# Patient Record
Sex: FEMALE | Race: WHITE | NOT HISPANIC OR LATINO
[De-identification: names, ages, dates, MRNs, and addresses within clinical notes are randomized per-mention and may not be internally consistent; named-entity substitution may affect disease eponyms.]

---

## 2017-12-01 PROBLEM — Z00.00 ENCOUNTER FOR PREVENTIVE HEALTH EXAMINATION: Status: ACTIVE | Noted: 2017-12-01

## 2017-12-27 ENCOUNTER — APPOINTMENT (OUTPATIENT)
Dept: NEUROLOGY | Facility: CLINIC | Age: 62
End: 2017-12-27
Payer: COMMERCIAL

## 2017-12-27 PROCEDURE — 99204 OFFICE O/P NEW MOD 45 MIN: CPT

## 2017-12-27 RX ORDER — NAPROXEN SODIUM 220 MG
TABLET ORAL
Refills: 0 | Status: ACTIVE | COMMUNITY

## 2018-03-28 ENCOUNTER — APPOINTMENT (OUTPATIENT)
Dept: NEUROLOGY | Facility: CLINIC | Age: 63
End: 2018-03-28
Payer: COMMERCIAL

## 2018-03-28 VITALS
DIASTOLIC BLOOD PRESSURE: 82 MMHG | BODY MASS INDEX: 25.16 KG/M2 | HEIGHT: 63 IN | WEIGHT: 142 LBS | SYSTOLIC BLOOD PRESSURE: 110 MMHG

## 2018-03-28 PROCEDURE — 99214 OFFICE O/P EST MOD 30 MIN: CPT

## 2018-03-28 RX ORDER — IBUPROFEN 200 MG/1
TABLET, COATED ORAL
Refills: 0 | Status: COMPLETED | COMMUNITY
End: 2018-03-28

## 2018-07-25 ENCOUNTER — APPOINTMENT (OUTPATIENT)
Dept: NEUROLOGY | Facility: CLINIC | Age: 63
End: 2018-07-25
Payer: COMMERCIAL

## 2018-07-25 VITALS
DIASTOLIC BLOOD PRESSURE: 74 MMHG | BODY MASS INDEX: 19.23 KG/M2 | HEIGHT: 72 IN | WEIGHT: 142 LBS | SYSTOLIC BLOOD PRESSURE: 110 MMHG

## 2018-07-25 PROCEDURE — 99213 OFFICE O/P EST LOW 20 MIN: CPT

## 2018-07-25 NOTE — PHYSICAL EXAM
[General Appearance - Alert] : alert [Oriented To Time, Place, And Person] : oriented to person, place, and time [Memory Recent] : recent memory was not impaired [Memory Remote] : remote memory was not impaired [Cranial Nerves Optic (II)] : visual acuity intact bilaterally,  visual fields full to confrontation, pupils equal round and reactive to light [Cranial Nerves Oculomotor (III)] : extraocular motion intact [Cranial Nerves Trigeminal (V)] : facial sensation intact symmetrically [Cranial Nerves Facial (VII)] : face symmetrical [Cranial Nerves Vestibulocochlear (VIII)] : hearing was intact bilaterally [Cranial Nerves Glossopharyngeal (IX)] : tongue and palate midline [Cranial Nerves Accessory (XI - Cranial And Spinal)] : head turning and shoulder shrug symmetric [Cranial Nerves Hypoglossal (XII)] : there was no tongue deviation with protrusion [Motor Strength] : muscle strength was normal in all four extremities [Sensation Tactile Decrease] : light touch was intact [Sensation Pain / Temperature Decrease] : pain and temperature was intact [Tremor] : a tremor present [1+] : Patella left 1+ [Aphasia] : no dysphasia/aphasia [Coordination - Dysmetria Impaired Finger-to-Nose Bilateral] : not present [Plantar Reflex Right Only] : normal on the right [Plantar Reflex Left Only] : normal on the left [FreeTextEntry6] : There is increased tone bilaterally with cogwheeling at the wrists and elbows. Strength is full to manual motor testing. The right hand is somewhat contracted. [FreeTextEntry8] : Gait is broad-based and shuffling. She requires a cane for support.

## 2018-07-25 NOTE — ASSESSMENT
[FreeTextEntry1] : This is a 63-year-old woman with Parkinson's disease.\par She is now on Sinemet.\par I will continue 25/100 3 times per day.\par \par The physical therapist Eric her exercises to do at home which she reports she is doing.\par \par I will see her back in 4 months.

## 2018-07-25 NOTE — CONSULT LETTER
[Dear  ___] : Dear  [unfilled], [Courtesy Letter:] : I had the pleasure of seeing your patient, [unfilled], in my office today. [Please see my note below.] : Please see my note below. [Consult Closing:] : Thank you very much for allowing me to participate in the care of this patient.  If you have any questions, please do not hesitate to contact me. [Sincerely,] : Sincerely, [FreeTextEntry3] : Javan Landaverde MD.

## 2018-07-25 NOTE — HISTORY OF PRESENT ILLNESS
[FreeTextEntry1] : I saw this patient in the office today.\par \par She had originally seen a neurologist back in 2001. She was ultimately diagnosed with Parkinson's disease. She declined treatment at that time.\par Her initial symptom was tremor involving predominantly the right hand. \par She believes this became worse in early 2011. It has been gradually progressing. Gradually it involved the left side to a lesser degree.\par She had seen Dr. Pritchett in this office back in 2014. She again declined treatment.\par She has now been noticing increasing stiffness over the past several years.\par Since mid 2017 she has noticed contraction in the right hand.\par She has associated balance difficulty. She has difficulty arising from a low chair.\par \par I had started her on Sinemet. I had also referred her physical therapy.\par She reports feeling significantly better with regard to her gait. She reports the tremors have decreased slightly as well.\par \par \par

## 2018-07-25 NOTE — DATA REVIEWED
[de-identified] : Brain MRI was performed on 4/8/11. 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 It was otherwise unremarkable. [de-identified] : Elda scan demonstrated bilateral reduced uptake in the basal ganglia.

## 2019-01-03 ENCOUNTER — APPOINTMENT (OUTPATIENT)
Dept: NEUROLOGY | Facility: CLINIC | Age: 64
End: 2019-01-03
Payer: COMMERCIAL

## 2019-01-03 VITALS
SYSTOLIC BLOOD PRESSURE: 120 MMHG | DIASTOLIC BLOOD PRESSURE: 72 MMHG | BODY MASS INDEX: 29.99 KG/M2 | WEIGHT: 180 LBS | HEIGHT: 65 IN

## 2019-01-03 PROCEDURE — 99213 OFFICE O/P EST LOW 20 MIN: CPT

## 2019-01-03 NOTE — DATA REVIEWED
[de-identified] : Brain MRI was performed on 4/8/11. 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 It was otherwise unremarkable. [de-identified] : Elda scan demonstrated bilateral reduced uptake in the basal ganglia.

## 2019-01-03 NOTE — PHYSICAL EXAM
[General Appearance - Alert] : alert [General Appearance - In No Acute Distress] : in no acute distress [Oriented To Time, Place, And Person] : oriented to person, place, and time [Memory Recent] : recent memory was not impaired [Memory Remote] : remote memory was not impaired [Cranial Nerves Optic (II)] : visual acuity intact bilaterally,  visual fields full to confrontation, pupils equal round and reactive to light [Cranial Nerves Oculomotor (III)] : extraocular motion intact [Cranial Nerves Trigeminal (V)] : facial sensation intact symmetrically [Cranial Nerves Facial (VII)] : face symmetrical [Cranial Nerves Vestibulocochlear (VIII)] : hearing was intact bilaterally [Cranial Nerves Glossopharyngeal (IX)] : tongue and palate midline [Cranial Nerves Accessory (XI - Cranial And Spinal)] : head turning and shoulder shrug symmetric [Cranial Nerves Hypoglossal (XII)] : there was no tongue deviation with protrusion [Motor Strength] : muscle strength was normal in all four extremities [Sensation Tactile Decrease] : light touch was intact [Sensation Pain / Temperature Decrease] : pain and temperature was intact [Sensation Vibration Decrease] : vibration was intact [Tremor] : a tremor present [1+] : Patella left 1+ [Optic Disc Abnormality] : the optic disc were normal in size and color [Edema] : there was no peripheral edema [Dysarthria] : no dysarthria [Aphasia] : no dysphasia/aphasia [Coordination - Dysmetria Impaired Finger-to-Nose Bilateral] : not present [Plantar Reflex Right Only] : normal on the right [Plantar Reflex Left Only] : normal on the left [FreeTextEntry6] : There is increased tone bilaterally with cogwheeling at the wrists and elbows. Strength is full to manual motor testing. The right hand is somewhat contracted. [FreeTextEntry8] : Gait is broad-based and shuffling. She requires a cane for support.

## 2019-01-03 NOTE — HISTORY OF PRESENT ILLNESS
[FreeTextEntry1] : I saw this patient in the office today.\par \par As you recall, she had originally seen a neurologist back in 2001. She was ultimately diagnosed with Parkinson's disease. She declined treatment at that time.\par Her initial symptom was tremor involving predominantly the right hand. \par She believes this became worse in early 2011. It has been gradually progressing. Gradually it involved the left side to a lesser degree.\par She had seen Dr. Pritchett in this office back in 2014. She again declined treatment.\par She has now been noticing increasing stiffness over the past several years.\par Since mid 2017 she has noticed contraction in the right hand.\par She has associated balance difficulty. She has difficulty arising from a low chair.\par \par I had ultimately started her on Sinemet. I had also referred her physical therapy.\par She reports feeling significantly better with regard to her gait. She reports the tremors have decreased slightly as well.\par \par \par

## 2019-01-03 NOTE — ASSESSMENT
[FreeTextEntry1] : This is a 63 year-old woman with Parkinson's disease.\par She is now on Sinemet.\par I will continue 25/100 3 times per day.\par \par The physical therapist gave her exercises to do at home which she reports she is doing.\par \par I will see her back in 6 months.

## 2019-04-10 ENCOUNTER — TRANSCRIPTION ENCOUNTER (OUTPATIENT)
Age: 64
End: 2019-04-10

## 2019-07-12 ENCOUNTER — MEDICATION RENEWAL (OUTPATIENT)
Age: 64
End: 2019-07-12

## 2019-08-08 ENCOUNTER — APPOINTMENT (OUTPATIENT)
Dept: NEUROLOGY | Facility: CLINIC | Age: 64
End: 2019-08-08
Payer: COMMERCIAL

## 2019-08-08 VITALS
BODY MASS INDEX: 29.99 KG/M2 | WEIGHT: 180 LBS | DIASTOLIC BLOOD PRESSURE: 80 MMHG | SYSTOLIC BLOOD PRESSURE: 110 MMHG | HEIGHT: 65 IN

## 2019-08-08 PROCEDURE — 99213 OFFICE O/P EST LOW 20 MIN: CPT

## 2019-08-08 NOTE — PHYSICAL EXAM
[General Appearance - Alert] : alert [Oriented To Time, Place, And Person] : oriented to person, place, and time [General Appearance - In No Acute Distress] : in no acute distress [Memory Recent] : recent memory was not impaired [Memory Remote] : remote memory was not impaired [Cranial Nerves Optic (II)] : visual acuity intact bilaterally,  visual fields full to confrontation, pupils equal round and reactive to light [Cranial Nerves Oculomotor (III)] : extraocular motion intact [Cranial Nerves Trigeminal (V)] : facial sensation intact symmetrically [Cranial Nerves Vestibulocochlear (VIII)] : hearing was intact bilaterally [Cranial Nerves Facial (VII)] : face symmetrical [Cranial Nerves Glossopharyngeal (IX)] : tongue and palate midline [Cranial Nerves Accessory (XI - Cranial And Spinal)] : head turning and shoulder shrug symmetric [Cranial Nerves Hypoglossal (XII)] : there was no tongue deviation with protrusion [Motor Strength] : muscle strength was normal in all four extremities [Sensation Tactile Decrease] : light touch was intact [Sensation Vibration Decrease] : vibration was intact [Sensation Pain / Temperature Decrease] : pain and temperature was intact [Tremor] : a tremor present [1+] : Patella left 1+ [Optic Disc Abnormality] : the optic disc were normal in size and color [Edema] : there was no peripheral edema [Dysarthria] : no dysarthria [Aphasia] : no dysphasia/aphasia [Coordination - Dysmetria Impaired Finger-to-Nose Bilateral] : not present [Plantar Reflex Right Only] : normal on the right [Plantar Reflex Left Only] : normal on the left [FreeTextEntry6] : There is increased tone bilaterally with cogwheeling at the wrists and elbows. Strength is full to manual motor testing. The right hand is somewhat contracted. [FreeTextEntry8] : Gait is broad-based and shuffling. She requires a cane for support.

## 2019-08-08 NOTE — ASSESSMENT
[FreeTextEntry1] : This is a 64 year-old woman with Parkinson's disease.\par She is now on Sinemet.\par I will continue 25/100 3 times per day.\par I will add Comtan 200 mg 3 times per day.\par \par Her physical therapist gave her exercises to do at home which she reports she is doing.\par \par I will see her back in 6 months.

## 2019-08-08 NOTE — HISTORY OF PRESENT ILLNESS
[FreeTextEntry1] : I saw this patient in the office today.\par \par As you recall, she had originally seen a neurologist back in 2001. She was ultimately diagnosed with Parkinson's disease. She declined treatment at that time.\par Her initial symptom was tremor involving predominantly the right hand. \par She believes this became worse in early 2011. It has been gradually progressing. Gradually it involved the left side to a lesser degree.\par She had seen Dr. Pritchett in this office back in 2014. She again declined treatment.\par She has now been noticing increasing stiffness over the past several years.\par Since mid 2017 she has noticed contraction in the right hand.\par She has associated balance difficulty. She has difficulty arising from a low chair.\par \par I had ultimately started her on Sinemet. I had also referred her physical therapy.\par She initially had reported feeling significantly better.\par \par She now reports slight progression since her last visit January\par \par \par

## 2019-08-08 NOTE — CONSULT LETTER
[Dear  ___] : Dear  [unfilled], [Please see my note below.] : Please see my note below. [Courtesy Letter:] : I had the pleasure of seeing your patient, [unfilled], in my office today. [Consult Closing:] : Thank you very much for allowing me to participate in the care of this patient.  If you have any questions, please do not hesitate to contact me. [Sincerely,] : Sincerely, [FreeTextEntry3] : Javan Landaverde MD.

## 2019-08-08 NOTE — DATA REVIEWED
[de-identified] : Elda scan demonstrated bilateral reduced uptake in the basal ganglia. [de-identified] : Brain MRI was performed on 4/8/11. 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 It was otherwise unremarkable.

## 2020-02-19 ENCOUNTER — APPOINTMENT (OUTPATIENT)
Dept: NEUROLOGY | Facility: CLINIC | Age: 65
End: 2020-02-19
Payer: COMMERCIAL

## 2020-02-19 VITALS
HEIGHT: 66 IN | SYSTOLIC BLOOD PRESSURE: 125 MMHG | BODY MASS INDEX: 25.71 KG/M2 | DIASTOLIC BLOOD PRESSURE: 60 MMHG | WEIGHT: 160 LBS

## 2020-02-19 PROCEDURE — 99213 OFFICE O/P EST LOW 20 MIN: CPT

## 2020-02-19 NOTE — ASSESSMENT
[FreeTextEntry1] : This is a 64 year-old woman with Parkinson's disease.\par She is now on Sinemet.\par I will continue 25/100 3 times per day.\par I have explained that should she notice progression she can begin the Comtan as previously planned.\par \par Her physical therapist had given her exercises to do at home which she reports she is doing.\par \par I will see her back in 6 months.

## 2020-02-19 NOTE — HISTORY OF PRESENT ILLNESS
[FreeTextEntry1] : I saw this patient in the office today.\par \par As you recall, she had originally seen a neurologist back in 2001. She was ultimately diagnosed with Parkinson's disease. She declined treatment at that time.\par Her initial symptom was tremor involving predominantly the right hand. \par She believes this became worse in early 2011. It has been gradually progressing. Gradually it involved the left side to a lesser degree.\par She had seen Dr. Pritchett in this office back in 2014. She again declined treatment.\par She has now been noticing increasing stiffness over the past several years.\par Since mid 2017 she has noticed contraction in the right hand.\par She has associated balance difficulty. She has difficulty arising from a low chair.\par \par I had ultimately started her on Sinemet. I had also referred her physical therapy.\par She initially had reported feeling significantly better.\par She then had some progression and I had added Comtan.\par She now reports that she never started it, and she has noticed no further progression.\par In fact she has been ablated without an assistive device.\par \par \par \par \par \par \par

## 2020-02-19 NOTE — PHYSICAL EXAM
[General Appearance - Alert] : alert [General Appearance - In No Acute Distress] : in no acute distress [Oriented To Time, Place, And Person] : oriented to person, place, and time [Affect] : the affect was normal [Memory Recent] : recent memory was not impaired [Memory Remote] : remote memory was not impaired [Cranial Nerves Optic (II)] : visual acuity intact bilaterally,  visual fields full to confrontation, pupils equal round and reactive to light [Cranial Nerves Oculomotor (III)] : extraocular motion intact [Cranial Nerves Vestibulocochlear (VIII)] : hearing was intact bilaterally [Cranial Nerves Facial (VII)] : face symmetrical [Cranial Nerves Trigeminal (V)] : facial sensation intact symmetrically [Cranial Nerves Glossopharyngeal (IX)] : tongue and palate midline [Cranial Nerves Hypoglossal (XII)] : there was no tongue deviation with protrusion [Cranial Nerves Accessory (XI - Cranial And Spinal)] : head turning and shoulder shrug symmetric [Sensation Tactile Decrease] : light touch was intact [Sensation Pain / Temperature Decrease] : pain and temperature was intact [Motor Strength] : muscle strength was normal in all four extremities [Sensation Vibration Decrease] : vibration was intact [Tremor] : a tremor present [1+] : Patella left 1+ [Optic Disc Abnormality] : the optic disc were normal in size and color [Edema] : there was no peripheral edema [Dysarthria] : no dysarthria [Plantar Reflex Right Only] : normal on the right [Aphasia] : no dysphasia/aphasia [Coordination - Dysmetria Impaired Finger-to-Nose Bilateral] : not present [Plantar Reflex Left Only] : normal on the left [FreeTextEntry6] : There is increased tone bilaterally with cogwheeling at the wrists and elbows. Strength is full to manual motor testing. The right hand is somewhat contracted. [FreeTextEntry8] : Gait is broad-based and shuffling. She is ambulating without an assistive device today.

## 2020-02-19 NOTE — DATA REVIEWED
[de-identified] : Brain MRI was performed on 4/8/11. 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 It was otherwise unremarkable. [de-identified] : Elda scan demonstrated bilateral reduced uptake in the basal ganglia.

## 2020-08-19 ENCOUNTER — APPOINTMENT (OUTPATIENT)
Dept: NEUROLOGY | Facility: CLINIC | Age: 65
End: 2020-08-19
Payer: COMMERCIAL

## 2020-08-19 PROCEDURE — 99213 OFFICE O/P EST LOW 20 MIN: CPT | Mod: 95

## 2020-08-19 NOTE — CONSULT LETTER
[Please see my note below.] : Please see my note below. [Courtesy Letter:] : I had the pleasure of seeing your patient, [unfilled], in my office today. [Dear  ___] : Dear  [unfilled], [Consult Closing:] : Thank you very much for allowing me to participate in the care of this patient.  If you have any questions, please do not hesitate to contact me. [Sincerely,] : Sincerely, [FreeTextEntry3] : Javan Landaverde MD.

## 2020-08-19 NOTE — DATA REVIEWED
[de-identified] : Brain MRI was performed on 4/8/11. 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 It was otherwise unremarkable. [de-identified] : Elda scan demonstrated bilateral reduced uptake in the basal ganglia.

## 2020-08-19 NOTE — HISTORY OF PRESENT ILLNESS
[Home] : at home, [unfilled] , at the time of the visit. [Medical Office: ([facility])___] : at the medical office located in  [FreeTextEntry1] : This patient had a tele health visit today. \par \par As you recall, she had originally seen a neurologist back in 2001. She was ultimately diagnosed with Parkinson's disease. She declined treatment at that time.\par Her initial symptom was tremor involving predominantly the right hand. \par She believes this became worse in early 2011. It has been gradually progressing. Gradually it involved the left side to a lesser degree.\par She had seen Dr. Pritchett in this office back in 2014. She again declined treatment.\par She has now been noticing increasing stiffness over the past several years.\par Since mid 2017 she has noticed contraction in the right hand.\par She has associated balance difficulty. She has difficulty arising from a low chair.\par \par I had ultimately started her on Sinemet. I had also referred her physical therapy.\par She initially had reported feeling significantly better.\par She then had some progression and I had added Comtan.\par She is now taking them three times per day. \par \par She has noticed that the doses did not last as long as they had previously.\par She also reports some difficulty moving around in bed.\par \par \par \par \par \par \par

## 2020-08-19 NOTE — PHYSICAL EXAM
[FreeTextEntry1] : Examination:\par Examination is limited due to the nature of tele health visit. \par \par The patient is well appearing and in no acute distress. \par \par Neurological Examination: \par Mental status: The patient is awake, alert, and oriented. Attention span seems normal. Recent and remote memory seem intact. \par Cranial nerves: Extraocular motion is full. Face appears symmetric, Shoulder shrug is symmetric, tongue is midline. \par Motor: There is no pronator drift. Fine finger movement is intact. There is mild tremor of the upper extremities at rest. \par Sensory: Unable to test.\par Reflexes: Unable to test. \par Cerebellar: No finger nose dysmetria.\par \par Gait is broad based. \par \par There is no edema seen. \par

## 2020-08-19 NOTE — ASSESSMENT
[FreeTextEntry1] : This is a 65 year-old woman with Parkinson's disease.\par She remains on Sinemet and Comtan three times per day.\par I will add a daytime dose of Sinemet.\par \par Her physical therapist had given her exercises to do at home which she reports she is doing.\par \par I will see her back in 6 months.

## 2021-02-17 ENCOUNTER — NON-APPOINTMENT (OUTPATIENT)
Age: 66
End: 2021-02-17

## 2021-04-01 ENCOUNTER — APPOINTMENT (OUTPATIENT)
Dept: NEUROLOGY | Facility: CLINIC | Age: 66
End: 2021-04-01
Payer: COMMERCIAL

## 2021-04-01 VITALS
HEIGHT: 66 IN | DIASTOLIC BLOOD PRESSURE: 62 MMHG | WEIGHT: 160 LBS | BODY MASS INDEX: 25.71 KG/M2 | SYSTOLIC BLOOD PRESSURE: 88 MMHG

## 2021-04-01 PROCEDURE — 99072 ADDL SUPL MATRL&STAF TM PHE: CPT

## 2021-04-01 PROCEDURE — 99213 OFFICE O/P EST LOW 20 MIN: CPT

## 2021-04-01 NOTE — ASSESSMENT
[FreeTextEntry1] : This is a 65 year-old woman with Parkinson's disease.\par She remains on Sinemet and Comtan three times per day.\par I had added a bedtime dose of Sinemet ER which she has note yet started. \par \par Her physical therapist had given her exercises to do at home which she reports she is doing.\par \par She would be a candidate for Kynmobi (sublingual strips) for the off episodes. \par At present she is hesitant as it requires a nurse to come in to her home to monitor the first dose. \par She will defer this for now (due to the pandemic) and we can discuss it further down the road. \par \par I will see her back in 4 months.

## 2021-04-01 NOTE — DATA REVIEWED
[de-identified] : Brain MRI was performed on 4/8/11. 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 It was otherwise unremarkable. [de-identified] : Elda scan demonstrated bilateral reduced uptake in the basal ganglia.

## 2021-04-01 NOTE — PHYSICAL EXAM
[General Appearance - Alert] : alert [General Appearance - In No Acute Distress] : in no acute distress [Oriented To Time, Place, And Person] : oriented to person, place, and time [Affect] : the affect was normal [Memory Recent] : recent memory was not impaired [Memory Remote] : remote memory was not impaired [Cranial Nerves Optic (II)] : visual acuity intact bilaterally,  visual fields full to confrontation, pupils equal round and reactive to light [Cranial Nerves Trigeminal (V)] : facial sensation intact symmetrically [Cranial Nerves Oculomotor (III)] : extraocular motion intact [Cranial Nerves Facial (VII)] : face symmetrical [Cranial Nerves Vestibulocochlear (VIII)] : hearing was intact bilaterally [Cranial Nerves Glossopharyngeal (IX)] : tongue and palate midline [Cranial Nerves Accessory (XI - Cranial And Spinal)] : head turning and shoulder shrug symmetric [Cranial Nerves Hypoglossal (XII)] : there was no tongue deviation with protrusion [Motor Strength] : muscle strength was normal in all four extremities [Sensation Tactile Decrease] : light touch was intact [Sensation Pain / Temperature Decrease] : pain and temperature was intact [Sensation Vibration Decrease] : vibration was intact [1+] : Patella left 1+ [Optic Disc Abnormality] : the optic disc were normal in size and color [Edema] : there was no peripheral edema [Tremor] : a tremor present [Dysarthria] : no dysarthria [Aphasia] : no dysphasia/aphasia [Coordination - Dysmetria Impaired Finger-to-Nose Bilateral] : not present [Plantar Reflex Right Only] : normal on the right [Plantar Reflex Left Only] : normal on the left [FreeTextEntry6] : There is increased tone bilaterally with cogwheeling at the wrists and elbows. Strength is full to manual motor testing. The right hand is somewhat contracted. [FreeTextEntry8] : Gait is broad-based and shuffling. She is ambulating without an assistive device today.

## 2021-04-01 NOTE — HISTORY OF PRESENT ILLNESS
[FreeTextEntry1] : I saw this patient in the office today. \par \par As you recall, she had originally seen a neurologist back in 2001. She was ultimately diagnosed with Parkinson's disease. She declined treatment at that time.\par Her initial symptom was tremor involving predominantly the right hand. \par She believes this became worse in early 2011. It has been gradually progressing. Gradually it involved the left side to a lesser degree.\par She had seen Dr. Pritchett in this office back in 2014. She again declined treatment.\par She has now been noticing increasing stiffness over the past several years.\par Since mid 2017 she has noticed contraction in the right hand.\par She has associated balance difficulty. She has difficulty arising from a low chair.\par \par I had ultimately started her on Sinemet. I had also referred her physical therapy.\par She initially had reported feeling significantly better.\par She then had some progression and I had added Comtan.\par She is now taking them three times per day. \par \par She has noticed that the doses did not last as long as they had previously.\par She also reports some difficulty moving around in bed.\par \par She reports some episodes in the afternoon when she feels as if she is "turned off" and  cannot move at all.\par \par \par \par \par

## 2021-05-01 ENCOUNTER — RX RENEWAL (OUTPATIENT)
Age: 66
End: 2021-05-01

## 2021-08-13 ENCOUNTER — NON-APPOINTMENT (OUTPATIENT)
Age: 66
End: 2021-08-13

## 2021-08-16 ENCOUNTER — APPOINTMENT (OUTPATIENT)
Dept: NEUROLOGY | Facility: CLINIC | Age: 66
End: 2021-08-16
Payer: COMMERCIAL

## 2021-08-16 PROCEDURE — 99213 OFFICE O/P EST LOW 20 MIN: CPT

## 2021-08-16 NOTE — PHYSICAL EXAM
[General Appearance - Alert] : alert [General Appearance - In No Acute Distress] : in no acute distress [Oriented To Time, Place, And Person] : oriented to person, place, and time [Affect] : the affect was normal [Memory Recent] : recent memory was not impaired [Memory Remote] : remote memory was not impaired [Cranial Nerves Optic (II)] : visual acuity intact bilaterally,  visual fields full to confrontation, pupils equal round and reactive to light [Cranial Nerves Oculomotor (III)] : extraocular motion intact [Cranial Nerves Trigeminal (V)] : facial sensation intact symmetrically [Cranial Nerves Facial (VII)] : face symmetrical [Cranial Nerves Vestibulocochlear (VIII)] : hearing was intact bilaterally [Cranial Nerves Glossopharyngeal (IX)] : tongue and palate midline [Cranial Nerves Accessory (XI - Cranial And Spinal)] : head turning and shoulder shrug symmetric [Cranial Nerves Hypoglossal (XII)] : there was no tongue deviation with protrusion [Motor Strength] : muscle strength was normal in all four extremities [Sensation Tactile Decrease] : light touch was intact [Sensation Pain / Temperature Decrease] : pain and temperature was intact [Sensation Vibration Decrease] : vibration was intact [Tremor] : a tremor present [1+] : Patella left 1+ [Optic Disc Abnormality] : the optic disc were normal in size and color [Edema] : there was no peripheral edema [Dysarthria] : no dysarthria [Aphasia] : no dysphasia/aphasia [Coordination - Dysmetria Impaired Finger-to-Nose Bilateral] : not present [Plantar Reflex Right Only] : normal on the right [Plantar Reflex Left Only] : normal on the left [FreeTextEntry6] : There is increased tone bilaterally with cogwheeling at the wrists and elbows. Strength is full to manual motor testing. The right hand is somewhat contracted. [FreeTextEntry8] : Gait is broad-based and shuffling. She is ambulating without an assistive device today.

## 2021-08-16 NOTE — ASSESSMENT
[FreeTextEntry1] : This is a 65 year-old woman with Parkinson's disease.\par She remains on Sinemet and Comtan three times per day.\par I had added a bedtime dose of Sinemet ER which she has note yet started. \par \par Her physical therapist had given her exercises to do at home which she reports she is doing.\par \par She would be a candidate for Kynmobi (sublingual strips) for the off episodes. \par I discussed this with her again.\par At present she is hesitant as it requires a nurse to come in to her home to monitor the first dose. \par She will defer this for now (due to the pandemic) and we can discuss it further down the road. \par \par I will see her back in 4 months.

## 2021-08-16 NOTE — HISTORY OF PRESENT ILLNESS
[FreeTextEntry1] : I saw this patient in the office today. \par \par As you recall, she had originally seen a neurologist back in 2001. She was ultimately diagnosed with Parkinson's disease. She declined treatment at that time.\par Her initial symptom was tremor involving predominantly the right hand. \par She believes this became worse in early 2011. It has been gradually progressing. Gradually it involved the left side to a lesser degree.\par She had seen Dr. Pritchett in this office back in 2014. She again declined treatment.\par She has now been noticing increasing stiffness over the past several years.\par Since mid 2017 she has noticed contraction in the right hand.\par She has associated balance difficulty. She has difficulty arising from a low chair.\par \par I had ultimately started her on Sinemet. I had also referred her physical therapy.\par She initially had reported feeling significantly better.\par She then had some progression and I had added Comtan.\par She is now taking them three times per day. \par I had added a bedtime dose of Sinemet ER.\par \par She has noticed that the doses did not last as long as they had previously.\par She also reports some difficulty moving around in bed.\par \par She reports some episodes in the afternoon when she feels as if she is "turned off" and  cannot move at all.\par \par \par \par \par

## 2021-08-16 NOTE — DATA REVIEWED
[de-identified] : Brain MRI was performed on 4/8/11. 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 It was otherwise unremarkable. [de-identified] : Elda scan demonstrated bilateral reduced uptake in the basal ganglia.

## 2022-01-10 ENCOUNTER — APPOINTMENT (OUTPATIENT)
Dept: NEUROLOGY | Facility: CLINIC | Age: 67
End: 2022-01-10
Payer: COMMERCIAL

## 2022-01-10 PROCEDURE — 99213 OFFICE O/P EST LOW 20 MIN: CPT | Mod: 95

## 2022-01-10 NOTE — DATA REVIEWED
[de-identified] : Brain MRI was performed on 4/8/11. 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 It was otherwise unremarkable. [de-identified] : Elda scan demonstrated bilateral reduced uptake in the basal ganglia.

## 2022-01-10 NOTE — HISTORY OF PRESENT ILLNESS
[Home] : at home, [unfilled] , at the time of the visit. [Medical Office: ([facility])___] : at the medical office located in  [FreeTextEntry1] : This patient had a tele health visit today. \par \par As you recall, she had originally seen a neurologist back in 2001. She was ultimately diagnosed with Parkinson's disease. She declined treatment at that time.\par Her initial symptom was tremor involving predominantly the right hand. \par She believes this became worse in early 2011. It has been gradually progressing. Gradually it involved the left side to a lesser degree.\par She had seen Dr. Pritchett in this office back in 2014. She again declined treatment.\par She has now been noticing increasing stiffness over the past several years.\par Since mid 2017 she has noticed contraction in the right hand.\par She has associated balance difficulty. She has difficulty arising from a low chair.\par \par I had ultimately started her on Sinemet. I had also referred her physical therapy.\par She initially had reported feeling significantly better.\par She then had some progression and I had added Comtan.\par She is now taking them three times per day. \par I had added a bedtime dose of Sinemet ER.\par \par She has noticed that the doses did not last as long as they had previously.\par She also reports some difficulty moving around in bed.\par \par She reported some episodes in the afternoon when she feels as if she is "turned off" and  cannot move at all.\par This has not been bothering her as much lately.\par \par \par \par

## 2022-01-10 NOTE — ASSESSMENT
[FreeTextEntry1] : This is a 66 year-old woman with Parkinson's disease.\par She remains on Sinemet and Comtan three times per day.\par I had added a bedtime dose of Sinemet ER.\par \par Her physical therapist had given her exercises to do at home which she reports she is doing.\par \par At present she does not appear to require "rescue" medication. \par (I would consider Inbrija (inhaled Levodopa) if this changes). \par \par I will see her back in 4 months.

## 2022-03-30 ENCOUNTER — NON-APPOINTMENT (OUTPATIENT)
Age: 67
End: 2022-03-30

## 2022-03-31 ENCOUNTER — APPOINTMENT (OUTPATIENT)
Dept: NEUROLOGY | Facility: CLINIC | Age: 67
End: 2022-03-31
Payer: COMMERCIAL

## 2022-03-31 VITALS
SYSTOLIC BLOOD PRESSURE: 80 MMHG | WEIGHT: 156 LBS | DIASTOLIC BLOOD PRESSURE: 60 MMHG | BODY MASS INDEX: 25.68 KG/M2 | HEIGHT: 65.5 IN

## 2022-03-31 PROCEDURE — 99213 OFFICE O/P EST LOW 20 MIN: CPT

## 2022-03-31 NOTE — DATA REVIEWED
[de-identified] : Brain MRI was performed on 4/8/11. 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 It was otherwise unremarkable. [de-identified] : Elda scan demonstrated bilateral reduced uptake in the basal ganglia.

## 2022-03-31 NOTE — HISTORY OF PRESENT ILLNESS
[FreeTextEntry1] : I saw this patient in the office today. \par \par As you recall, she had originally seen a neurologist back in 2001. She was ultimately diagnosed with Parkinson's disease. She declined treatment at that time.\par Her initial symptom was tremor involving predominantly the right hand. \par She believes this became worse in early 2011. It has been gradually progressing. Gradually it involved the left side to a lesser degree.\par She had seen Dr. Pritchett in this office back in 2014. She again declined treatment.\par She has now been noticing increasing stiffness over the past several years.\par Since mid 2017 she has noticed contraction in the right hand.\par She has associated balance difficulty. She has difficulty arising from a low chair.\par \par I had ultimately started her on Sinemet. I had also referred her physical therapy.\par She initially had reported feeling significantly better.\par She then had some progression and I had added Comtan.\par She is now taking them three times per day. \par I had added a bedtime dose of Sinemet ER.\par \par She has noticed that the doses did not last as long as they had previously.\par She also reports some difficulty moving around in bed.\par \par She reported some episodes in the afternoon when she feels as if she is "turned off" and  cannot move at all.\par This has been getting worse.\par \par \par \par

## 2022-04-07 ENCOUNTER — NON-APPOINTMENT (OUTPATIENT)
Age: 67
End: 2022-04-07

## 2022-05-11 ENCOUNTER — APPOINTMENT (OUTPATIENT)
Dept: NEUROLOGY | Facility: CLINIC | Age: 67
End: 2022-05-11
Payer: COMMERCIAL

## 2022-05-11 VITALS
HEIGHT: 65 IN | SYSTOLIC BLOOD PRESSURE: 112 MMHG | DIASTOLIC BLOOD PRESSURE: 80 MMHG | WEIGHT: 155 LBS | BODY MASS INDEX: 25.83 KG/M2

## 2022-05-11 PROCEDURE — 99213 OFFICE O/P EST LOW 20 MIN: CPT

## 2022-05-11 NOTE — DATA REVIEWED
[de-identified] : Brain MRI was performed on 4/8/11. 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 It was otherwise unremarkable. [de-identified] : Elda scan demonstrated bilateral reduced uptake in the basal ganglia.

## 2022-06-13 ENCOUNTER — RX RENEWAL (OUTPATIENT)
Age: 67
End: 2022-06-13

## 2022-07-11 ENCOUNTER — RX RENEWAL (OUTPATIENT)
Age: 67
End: 2022-07-11

## 2022-08-26 ENCOUNTER — RX RENEWAL (OUTPATIENT)
Age: 67
End: 2022-08-26

## 2022-09-14 ENCOUNTER — RX RENEWAL (OUTPATIENT)
Age: 67
End: 2022-09-14

## 2022-09-19 ENCOUNTER — APPOINTMENT (OUTPATIENT)
Dept: NEUROLOGY | Facility: CLINIC | Age: 67
End: 2022-09-19

## 2022-09-19 VITALS
BODY MASS INDEX: 25.83 KG/M2 | DIASTOLIC BLOOD PRESSURE: 70 MMHG | SYSTOLIC BLOOD PRESSURE: 120 MMHG | HEIGHT: 65 IN | WEIGHT: 155 LBS

## 2022-09-19 PROCEDURE — 99213 OFFICE O/P EST LOW 20 MIN: CPT

## 2022-09-19 NOTE — DATA REVIEWED
[de-identified] : Brain MRI was performed on 4/8/11. 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 It was otherwise unremarkable. [de-identified] : Elda scan demonstrated bilateral reduced uptake in the basal ganglia.

## 2022-09-19 NOTE — ASSESSMENT
[FreeTextEntry1] : This is a 67 year-old woman with Parkinson's disease.\par She remains on Sinemet and Comtan three times per day.\par I had added a bedtime dose of Sinemet ER.\par \par Her physical therapist had given her exercises to do at home which she reports she is doing.\par \par She had reported off episodes in the afternoon.\par I had suggested a trial of Inbrija to be used as needed.  (This is an inhaled rescue dose of levodopa).\par \par I will see her back in a few months.

## 2022-09-30 ENCOUNTER — TRANSCRIPTION ENCOUNTER (OUTPATIENT)
Age: 67
End: 2022-09-30

## 2022-12-22 ENCOUNTER — RX RENEWAL (OUTPATIENT)
Age: 67
End: 2022-12-22

## 2022-12-26 ENCOUNTER — RX RENEWAL (OUTPATIENT)
Age: 67
End: 2022-12-26

## 2023-01-02 ENCOUNTER — NON-APPOINTMENT (OUTPATIENT)
Age: 68
End: 2023-01-02

## 2023-01-03 ENCOUNTER — APPOINTMENT (OUTPATIENT)
Dept: NEUROLOGY | Facility: CLINIC | Age: 68
End: 2023-01-03
Payer: MEDICARE

## 2023-01-03 VITALS
HEIGHT: 65 IN | BODY MASS INDEX: 25.83 KG/M2 | DIASTOLIC BLOOD PRESSURE: 60 MMHG | WEIGHT: 155 LBS | SYSTOLIC BLOOD PRESSURE: 110 MMHG

## 2023-01-03 PROCEDURE — 99213 OFFICE O/P EST LOW 20 MIN: CPT

## 2023-01-03 NOTE — HISTORY OF PRESENT ILLNESS
[FreeTextEntry1] : I saw this patient in the office today. \par \par As you recall, she had originally seen a neurologist back in 2001. She was ultimately diagnosed with Parkinson's disease. She declined treatment at that time.\par Her initial symptom was tremor involving predominantly the right hand. \par She believes this became worse in early 2011. It has been gradually progressing. Gradually it involved the left side to a lesser degree.\par She had seen Dr. Pritchett in this office back in 2014. She again declined treatment.\par She has now been noticing increasing stiffness over the past several years.\par Since mid 2017 she has noticed contraction in the right hand.\par She has associated balance difficulty. She has difficulty arising from a low chair.\par \par I had ultimately started her on Sinemet. I had also referred her physical therapy.\par She initially had reported feeling significantly better.\par She then had some progression and I had added Comtan.\par She is now taking them three times per day. \par I had added a bedtime dose of Sinemet ER.\par \par She reported some episodes in the afternoon when she feels as if she is "turned off" and  cannot move at all.\par This has been getting worse.\par I had started her on Inbrija as needed.\par \par 1/3/2023 visit:\par She reports feeling relatively stable at this point.\par

## 2023-01-03 NOTE — DATA REVIEWED
[de-identified] : Brain MRI was performed on 4/8/11. 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 It was otherwise unremarkable. [de-identified] : Elda scan demonstrated bilateral reduced uptake in the basal ganglia.

## 2023-01-03 NOTE — ASSESSMENT
[FreeTextEntry1] : This is a 67 year-old woman with Parkinson's disease.\par She remains on Sinemet and Comtan three times per day.\par I had added a bedtime dose of Sinemet ER.\par \par Her physical therapist had given her exercises to do at home which she reports she is doing.\par \par She had reported off episodes in the afternoon.\par I had suggested a trial of Inbrija to be used as needed.  (This is an inhaled rescue dose of levodopa).\par \par I will see her back in 4 months.

## 2023-02-24 ENCOUNTER — RX RENEWAL (OUTPATIENT)
Age: 68
End: 2023-02-24

## 2023-03-27 ENCOUNTER — RX RENEWAL (OUTPATIENT)
Age: 68
End: 2023-03-27

## 2023-04-14 ENCOUNTER — APPOINTMENT (OUTPATIENT)
Dept: NEUROLOGY | Facility: CLINIC | Age: 68
End: 2023-04-14
Payer: MEDICARE

## 2023-04-14 VITALS
SYSTOLIC BLOOD PRESSURE: 110 MMHG | WEIGHT: 155 LBS | HEIGHT: 65 IN | DIASTOLIC BLOOD PRESSURE: 70 MMHG | BODY MASS INDEX: 25.83 KG/M2

## 2023-04-14 PROCEDURE — 99213 OFFICE O/P EST LOW 20 MIN: CPT

## 2023-04-14 NOTE — ASSESSMENT
[FreeTextEntry1] : This is a 67 year-old woman with Parkinson's disease.\par She remains on Sinemet and Comtan three times per day.\par I had added a bedtime dose of Sinemet ER.\par \par Her physical therapist had given her exercises to do at home which she reports she is doing.\par \par She had reported off episodes in the afternoon.\par I had suggested a trial of Inbrija to be used as needed.  (This is an inhaled rescue dose of levodopa).\par \par I will see her back in 4-5 months.

## 2023-04-14 NOTE — DATA REVIEWED
[de-identified] : Brain MRI was performed on 4/8/11. 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 It was otherwise unremarkable. [de-identified] : Elda scan demonstrated bilateral reduced uptake in the basal ganglia.

## 2023-05-02 ENCOUNTER — RX RENEWAL (OUTPATIENT)
Age: 68
End: 2023-05-02

## 2023-05-22 ENCOUNTER — NON-APPOINTMENT (OUTPATIENT)
Age: 68
End: 2023-05-22

## 2023-05-23 ENCOUNTER — OFFICE (OUTPATIENT)
Dept: URBAN - METROPOLITAN AREA CLINIC 104 | Facility: CLINIC | Age: 68
Setting detail: OPHTHALMOLOGY
End: 2023-05-23
Payer: MEDICARE

## 2023-05-23 DIAGNOSIS — H43.393: ICD-10-CM

## 2023-05-23 DIAGNOSIS — H25.13: ICD-10-CM

## 2023-05-23 DIAGNOSIS — H25.11: ICD-10-CM

## 2023-05-23 DIAGNOSIS — H43.813: ICD-10-CM

## 2023-05-23 PROBLEM — H25.12 CATARACT SENILE NUCLEAR SCLEROSIS; RIGHT EYE, LEFT EYE, BOTH EYES ;
W/CORTICAL: Status: ACTIVE | Noted: 2023-05-23

## 2023-05-23 PROCEDURE — 99204 OFFICE O/P NEW MOD 45 MIN: CPT | Performed by: SPECIALIST

## 2023-05-23 PROCEDURE — 92136 OPHTHALMIC BIOMETRY: CPT | Performed by: SPECIALIST

## 2023-05-23 ASSESSMENT — REFRACTION_AUTOREFRACTION
OD_CYLINDER: -1.00
OS_SPHERE: PLANO
OD_AXIS: 108
OD_SPHERE: -0.50
OS_AXIS: 122
OS_CYLINDER: -1.00

## 2023-05-23 ASSESSMENT — KERATOMETRY
OD_K1POWER_DIOPTERS: 42.45
OS_K1K2_AVERAGE: 42.695
OD_K2POWER_DIOPTERS: 43.21
OS_K1POWER_DIOPTERS: 42.56
OD_CYLAXISANGLE_DEGREES: 084
OD_K1POWER_DIOPTERS: 42.45
OS_AXISANGLE_DEGREES: 056
OD_AXISANGLE_DEGREES: 084
OS_CYLPOWER_DEGREES: 0.27
OD_AXISANGLE2_DEGREES: 084
OD_K2POWER_DIOPTERS: 43.21
OS_AXISANGLE2_DEGREES: 056
OD_AXISANGLE_DEGREES: 174
OD_CYLPOWER_DEGREES: 0.76
OS_AXISANGLE_DEGREES: 146
OS_K2POWER_DIOPTERS: 42.83
OS_CYLAXISANGLE_DEGREES: 056
OD_K1K2_AVERAGE: 42.83
OS_K2POWER_DIOPTERS: 42.83
OS_K1POWER_DIOPTERS: 42.56

## 2023-05-23 ASSESSMENT — REFRACTION_MANIFEST
OD_ADD: +2.00
OS_AXIS: 120
OD_VA1: 20/60-
OD_SPHERE: -0.75
OS_CYLINDER: -0.75
OS_VA1: 20/30-
OS_ADD: +2.00
OS_SPHERE: -0.50
OD_CYLINDER: -1.00
OD_AXIS: 110

## 2023-05-23 ASSESSMENT — AXIALLENGTH_DERIVED
OD_AL: 24.35
OD_AL: 24.25
OS_AL: 24.25

## 2023-05-23 ASSESSMENT — SPHEQUIV_DERIVED
OD_SPHEQUIV: -1.25
OS_SPHEQUIV: -0.875
OD_SPHEQUIV: -1

## 2023-05-23 ASSESSMENT — REFRACTION_CURRENTRX
OD_OVR_VA: 20/
OS_OVR_VA: 20/

## 2023-05-23 ASSESSMENT — TONOMETRY
OD_IOP_MMHG: 16
OS_IOP_MMHG: 16

## 2023-05-23 ASSESSMENT — VISUAL ACUITY
OD_BCVA: 20/40-1
OS_BCVA: 20/60-2

## 2023-06-26 ENCOUNTER — RX RENEWAL (OUTPATIENT)
Age: 68
End: 2023-06-26

## 2023-08-11 ENCOUNTER — RX ONLY (RX ONLY)
Age: 68
End: 2023-08-11

## 2023-08-11 ENCOUNTER — ASC (OUTPATIENT)
Dept: URBAN - METROPOLITAN AREA SURGERY 8 | Facility: SURGERY | Age: 68
Setting detail: OPHTHALMOLOGY
End: 2023-08-11
Payer: MEDICARE

## 2023-08-11 DIAGNOSIS — H52.211: ICD-10-CM

## 2023-08-11 DIAGNOSIS — H25.11: ICD-10-CM

## 2023-08-11 PROCEDURE — A9270 NON-COVERED ITEM OR SERVICE: HCPCS | Performed by: SPECIALIST

## 2023-08-11 PROCEDURE — FEMTO FEMTOSECOND LASER: Performed by: SPECIALIST

## 2023-08-11 PROCEDURE — 66984 XCAPSL CTRC RMVL W/O ECP: CPT | Performed by: SPECIALIST

## 2023-08-12 ENCOUNTER — OFFICE (OUTPATIENT)
Dept: URBAN - METROPOLITAN AREA CLINIC 104 | Facility: CLINIC | Age: 68
Setting detail: OPHTHALMOLOGY
End: 2023-08-12
Payer: MEDICARE

## 2023-08-12 DIAGNOSIS — Z96.1: ICD-10-CM

## 2023-08-12 PROCEDURE — 99024 POSTOP FOLLOW-UP VISIT: CPT | Performed by: OPTOMETRIST

## 2023-08-12 ASSESSMENT — CONFRONTATIONAL VISUAL FIELD TEST (CVF)
OD_FINDINGS: FULL
OS_FINDINGS: FULL

## 2023-08-12 ASSESSMENT — VISUAL ACUITY
OD_BCVA: 20/40
OS_BCVA: 20/20

## 2023-08-12 ASSESSMENT — TONOMETRY: OD_IOP_MMHG: 16

## 2023-08-16 ENCOUNTER — OFFICE (OUTPATIENT)
Dept: URBAN - METROPOLITAN AREA CLINIC 104 | Facility: CLINIC | Age: 68
Setting detail: OPHTHALMOLOGY
End: 2023-08-16
Payer: MEDICARE

## 2023-08-16 ENCOUNTER — RX RENEWAL (OUTPATIENT)
Age: 68
End: 2023-08-16

## 2023-08-16 DIAGNOSIS — Z96.1: ICD-10-CM

## 2023-08-16 DIAGNOSIS — H25.12: ICD-10-CM

## 2023-08-16 PROCEDURE — 92136 OPHTHALMIC BIOMETRY: CPT | Performed by: SPECIALIST

## 2023-08-16 PROCEDURE — 99024 POSTOP FOLLOW-UP VISIT: CPT | Performed by: SPECIALIST

## 2023-08-16 ASSESSMENT — VISUAL ACUITY
OS_BCVA: 20/20
OD_BCVA: 20/40

## 2023-08-16 ASSESSMENT — REFRACTION_AUTOREFRACTION
OD_SPHERE: +0.25
OD_CYLINDER: -0.50
OS_AXIS: 121
OD_AXIS: 007
OS_CYLINDER: -1.00
OS_SPHERE: PLANO

## 2023-08-16 ASSESSMENT — CONFRONTATIONAL VISUAL FIELD TEST (CVF)
OS_FINDINGS: FULL
OD_FINDINGS: FULL

## 2023-08-16 ASSESSMENT — TONOMETRY: OD_IOP_MMHG: 12

## 2023-08-16 ASSESSMENT — SPHEQUIV_DERIVED: OD_SPHEQUIV: 0

## 2023-08-17 ENCOUNTER — RX ONLY (RX ONLY)
Age: 68
End: 2023-08-17

## 2023-08-31 ENCOUNTER — APPOINTMENT (OUTPATIENT)
Dept: NEUROLOGY | Facility: CLINIC | Age: 68
End: 2023-08-31
Payer: MEDICARE

## 2023-08-31 PROCEDURE — 99213 OFFICE O/P EST LOW 20 MIN: CPT

## 2023-08-31 NOTE — HISTORY OF PRESENT ILLNESS
[FreeTextEntry1] : I saw this patient in the office today.   As you recall, she had originally seen a neurologist back in 2001. She was ultimately diagnosed with Parkinson's disease. She declined treatment at that time. Her initial symptom was tremor involving predominantly the right hand.  She believes this became worse in early 2011. It has been gradually progressing. Gradually it involved the left side to a lesser degree. She had seen Dr. Pritchett in this office back in 2014. She again declined treatment. She has now been noticing increasing stiffness over the past several years. Since mid 2017 she has noticed contraction in the right hand. She has associated balance difficulty. She has difficulty arising from a low chair.  I had ultimately started her on Sinemet. I had also referred her physical therapy. She initially had reported feeling significantly better. She then had some progression and I had added Comtan. She is now taking them three times per day.  I had added a bedtime dose of Sinemet ER.  She reported some episodes in the afternoon when she feels as if she is "turned off" and  cannot move at all. This has been getting worse. I had started her on Inbrija as needed.  1/3/2023 visit: She reports feeling relatively stable at this point.  8/31/2023 visit: She had recent dental work and cataract surgery. She has noticed that she is having more difficulty initiating movements particularly in the morning. She increased to 4 tablets of Sinemet per day.  Continues to take an extended release tablet at bedtime.

## 2023-08-31 NOTE — DATA REVIEWED
[de-identified] : Brain MRI was performed on 4/8/11. 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 It was otherwise unremarkable. [de-identified] : Elda scan demonstrated bilateral reduced uptake in the basal ganglia.

## 2023-08-31 NOTE — ASSESSMENT
[FreeTextEntry1] : This is a 67 year-old woman with Parkinson's disease. She remains on Sinemet and Comtan. I had added a bedtime dose of Sinemet ER. I will increase the immediate release Sinemet to 2 tablets at 7 AM, 1 tablet at 11 AM, 2 tablets at 3 PM, and 1 tablet at 7 PM and continue the extended-release tablet at bedtime.  Her physical therapist had given her exercises to do at home which she reports she is doing.  She had reported off episodes in the afternoon. I had suggested a trial of Inbrija to be used as needed.  (This is an inhaled rescue dose of levodopa). She is using this with good response.  I will see her back in 6 months.

## 2023-08-31 NOTE — PHYSICAL EXAM
[General Appearance - Alert] : alert [General Appearance - In No Acute Distress] : in no acute distress [Oriented To Time, Place, And Person] : oriented to person, place, and time [Affect] : the affect was normal [Memory Recent] : recent memory was not impaired [Memory Remote] : remote memory was not impaired [Dysarthria] : no dysarthria [Aphasia] : no dysphasia/aphasia [Cranial Nerves Optic (II)] : visual acuity intact bilaterally,  visual fields full to confrontation, pupils equal round and reactive to light [Cranial Nerves Oculomotor (III)] : extraocular motion intact [Cranial Nerves Trigeminal (V)] : facial sensation intact symmetrically [Cranial Nerves Facial (VII)] : face symmetrical [Cranial Nerves Vestibulocochlear (VIII)] : hearing was intact bilaterally [Cranial Nerves Glossopharyngeal (IX)] : tongue and palate midline [Cranial Nerves Accessory (XI - Cranial And Spinal)] : head turning and shoulder shrug symmetric [Cranial Nerves Hypoglossal (XII)] : there was no tongue deviation with protrusion [Motor Strength] : muscle strength was normal in all four extremities [Sensation Tactile Decrease] : light touch was intact [Sensation Pain / Temperature Decrease] : pain and temperature was intact [Sensation Vibration Decrease] : vibration was intact [Tremor] : a tremor present [Coordination - Dysmetria Impaired Finger-to-Nose Bilateral] : not present [1+] : Patella left 1+ [Plantar Reflex Right Only] : normal on the right [Plantar Reflex Left Only] : normal on the left [FreeTextEntry6] : There is increased tone bilaterally with cogwheeling at the wrists and elbows. Strength is full to manual motor testing. The right hand is somewhat contracted. [FreeTextEntry8] : Gait is broad-based and shuffling. She is ambulating without an assistive device today. [Optic Disc Abnormality] : the optic disc were normal in size and color [Edema] : there was no peripheral edema

## 2023-09-01 ENCOUNTER — TRANSCRIPTION ENCOUNTER (OUTPATIENT)
Age: 68
End: 2023-09-01

## 2023-11-07 ENCOUNTER — RX RENEWAL (OUTPATIENT)
Age: 68
End: 2023-11-07

## 2024-02-29 ENCOUNTER — APPOINTMENT (OUTPATIENT)
Dept: NEUROLOGY | Facility: CLINIC | Age: 69
End: 2024-02-29
Payer: MEDICARE

## 2024-02-29 VITALS
BODY MASS INDEX: 25.83 KG/M2 | SYSTOLIC BLOOD PRESSURE: 118 MMHG | WEIGHT: 155 LBS | HEIGHT: 65 IN | DIASTOLIC BLOOD PRESSURE: 78 MMHG

## 2024-02-29 DIAGNOSIS — G20.A1 PARKINSON'S DISEASE WITHOUT DYSKINESIA, WITHOUT MENTION OF FLUCTUATIONS: ICD-10-CM

## 2024-02-29 PROCEDURE — G2211 COMPLEX E/M VISIT ADD ON: CPT

## 2024-02-29 PROCEDURE — 99213 OFFICE O/P EST LOW 20 MIN: CPT

## 2024-02-29 RX ORDER — CARBIDOPA AND LEVODOPA 25; 100 MG/1; MG/1
25-100 TABLET ORAL 4 TIMES DAILY
Qty: 720 | Refills: 1 | Status: ACTIVE | COMMUNITY
Start: 2017-12-27 | End: 1900-01-01

## 2024-02-29 RX ORDER — ENTACAPONE 200 MG/1
200 TABLET, FILM COATED ORAL
Qty: 270 | Refills: 1 | Status: ACTIVE | COMMUNITY
Start: 2019-08-08 | End: 1900-01-01

## 2024-02-29 RX ORDER — CARBIDOPA AND LEVODOPA 50; 200 MG/1; MG/1
50-200 TABLET, EXTENDED RELEASE ORAL
Qty: 90 | Refills: 1 | Status: ACTIVE | COMMUNITY
Start: 2021-02-17 | End: 1900-01-01

## 2024-02-29 NOTE — ASSESSMENT
[FreeTextEntry1] : This is a 68 year-old woman with Parkinson's disease. She remains on Sinemet and Comtan. I had added a bedtime dose of Sinemet ER. I will increase the immediate release Sinemet to 2 tablets at 7 AM, 1 tablet at 11 AM, 2 tablets at 3 PM, and 1 tablet at 7 PM and continue the extended-release tablet at bedtime.  She had reported off episodes in the afternoon. I had suggested a trial of Inbrija to be used as needed.  (This is an inhaled rescue dose of levodopa). She is using this with good response.  I will refer her back to physical therapy. I have also given her a referral to the movement disorder center at her request.  I will see her back in 6 months.

## 2024-02-29 NOTE — CONSULT LETTER
[Dear  ___] : Dear  [unfilled], [Courtesy Letter:] : I had the pleasure of seeing your patient, [unfilled], in my office today. [Consult Closing:] : Thank you very much for allowing me to participate in the care of this patient.  If you have any questions, please do not hesitate to contact me. [Please see my note below.] : Please see my note below. [Sincerely,] : Sincerely, [FreeTextEntry3] : Javan Landaverde MD.

## 2024-02-29 NOTE — PHYSICAL EXAM
[General Appearance - Alert] : alert [General Appearance - In No Acute Distress] : in no acute distress [Oriented To Time, Place, And Person] : oriented to person, place, and time [Affect] : the affect was normal [Memory Recent] : recent memory was not impaired [Memory Remote] : remote memory was not impaired [Dysarthria] : no dysarthria [Aphasia] : no dysphasia/aphasia [Cranial Nerves Optic (II)] : visual acuity intact bilaterally,  visual fields full to confrontation, pupils equal round and reactive to light [Cranial Nerves Oculomotor (III)] : extraocular motion intact [Cranial Nerves Trigeminal (V)] : facial sensation intact symmetrically [Cranial Nerves Facial (VII)] : face symmetrical [Cranial Nerves Vestibulocochlear (VIII)] : hearing was intact bilaterally [Cranial Nerves Glossopharyngeal (IX)] : tongue and palate midline [Cranial Nerves Hypoglossal (XII)] : there was no tongue deviation with protrusion [Cranial Nerves Accessory (XI - Cranial And Spinal)] : head turning and shoulder shrug symmetric [Motor Strength] : muscle strength was normal in all four extremities [Sensation Tactile Decrease] : light touch was intact [Sensation Pain / Temperature Decrease] : pain and temperature was intact [Sensation Vibration Decrease] : vibration was intact [Tremor] : a tremor present [Coordination - Dysmetria Impaired Finger-to-Nose Bilateral] : not present [1+] : Patella left 1+ [Plantar Reflex Right Only] : normal on the right [FreeTextEntry6] : There is increased tone bilaterally with cogwheeling at the wrists and elbows. Strength is full to manual motor testing. The right hand is somewhat contracted. [Plantar Reflex Left Only] : normal on the left [FreeTextEntry8] : Gait is broad-based and shuffling. She is ambulating without an assistive device today. [Optic Disc Abnormality] : the optic disc were normal in size and color [Edema] : there was no peripheral edema

## 2024-02-29 NOTE — DATA REVIEWED
[de-identified] : Brain MRI was performed on 4/8/11. 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 It was otherwise unremarkable. [de-identified] : Elda scan demonstrated bilateral reduced uptake in the basal ganglia.

## 2024-02-29 NOTE — HISTORY OF PRESENT ILLNESS
[FreeTextEntry1] : I saw this patient in the office today.   As you recall, she had originally seen a neurologist back in 2001. She was ultimately diagnosed with Parkinson's disease. She declined treatment at that time. Her initial symptom was tremor involving predominantly the right hand.  She believes this became worse in early 2011. It has been gradually progressing. Gradually it involved the left side to a lesser degree. She had seen Dr. Pritchett in this office back in 2014. She again declined treatment. She has now been noticing increasing stiffness over the past several years. Since mid 2017 she has noticed contraction in the right hand. She has associated balance difficulty. She has difficulty arising from a low chair.  I had ultimately started her on Sinemet. I had also referred her physical therapy. She initially had reported feeling significantly better. She then had some progression and I had added Comtan. She is now taking them three times per day.  I had added a bedtime dose of Sinemet ER.  She reported some episodes in the afternoon when she feels as if she is "turned off" and  cannot move at all. This has been getting worse. I had started her on Inbrija as needed.  8/31/2023 visit: She had recent dental work and cataract surgery. She has noticed that she is having more difficulty initiating movements particularly in the morning. She increased to 4 tablets of Sinemet per day.  Continues to take an extended-release tablet at bedtime.  2/29/2024 visit:

## 2024-05-15 ENCOUNTER — RX RENEWAL (OUTPATIENT)
Age: 69
End: 2024-05-15

## 2024-05-15 RX ORDER — LEVODOPA 42 MG/1
42 CAPSULE RESPIRATORY (INHALATION)
Qty: 180 | Refills: 5 | Status: ACTIVE | COMMUNITY
Start: 2022-04-07 | End: 1900-01-01

## 2024-07-10 ENCOUNTER — TRANSCRIPTION ENCOUNTER (OUTPATIENT)
Age: 69
End: 2024-07-10

## 2024-08-05 ENCOUNTER — RX RENEWAL (OUTPATIENT)
Age: 69
End: 2024-08-05

## 2024-09-12 ENCOUNTER — APPOINTMENT (OUTPATIENT)
Dept: NEUROLOGY | Facility: CLINIC | Age: 69
End: 2024-09-12
Payer: MEDICARE

## 2024-09-12 VITALS
WEIGHT: 155 LBS | DIASTOLIC BLOOD PRESSURE: 57 MMHG | HEIGHT: 65 IN | SYSTOLIC BLOOD PRESSURE: 102 MMHG | BODY MASS INDEX: 25.83 KG/M2 | HEART RATE: 73 BPM

## 2024-09-12 DIAGNOSIS — G20.A1 PARKINSON'S DISEASE WITHOUT DYSKINESIA, WITHOUT MENTION OF FLUCTUATIONS: ICD-10-CM

## 2024-09-12 PROCEDURE — G2211 COMPLEX E/M VISIT ADD ON: CPT

## 2024-09-12 PROCEDURE — 99213 OFFICE O/P EST LOW 20 MIN: CPT

## 2024-09-12 NOTE — ASSESSMENT
[FreeTextEntry1] : This is a 69-year-old woman with Parkinson's disease. She remains on Sinemet and Comtan. I had added a bedtime dose of Sinemet ER. I will increase the immediate release Sinemet to 2 tablets at 7 AM, 1 tablet at 11 AM, 2 tablets at 3 PM, and 1 tablet at 7 PM and continue the extended-release tablet at bedtime.  She had reported off episodes in the afternoon. I had suggested a trial of Inbrija to be used as needed.  (This is an inhaled rescue dose of levodopa). She is using this with good response.  I will refer her back to physical therapy. I had also given her a referral to the movement disorder center at her request.  I will see her back in 6 months.

## 2024-09-12 NOTE — HISTORY OF PRESENT ILLNESS
[FreeTextEntry1] : I saw this patient in the office today.   As you recall, she had originally seen a neurologist back in 2001. She was ultimately diagnosed with Parkinson's disease. She declined treatment at that time. Her initial symptom was tremor involving predominantly the right hand.  She believes this became worse in early 2011. It has been gradually progressing. Gradually it involved the left side to a lesser degree. She had seen Dr. Pritchett in this office back in 2014. She again declined treatment. She has now been noticing increasing stiffness over the past several years. Since mid 2017 she has noticed contraction in the right hand. She has associated balance difficulty. She has difficulty arising from a low chair.  I had ultimately started her on Sinemet. I had also referred her physical therapy. She initially had reported feeling significantly better. She then had some progression and I had added Comtan. She is now taking them three times per day.  I had added a bedtime dose of Sinemet ER.  She reported some episodes in the afternoon when she feels as if she is "turned off" and  cannot move at all. This has been getting worse. I had started her on Inbrija as needed.  9/12/2024 visit:

## 2024-09-12 NOTE — PHYSICAL EXAM
[General Appearance - Alert] : alert [General Appearance - In No Acute Distress] : in no acute distress [Oriented To Time, Place, And Person] : oriented to person, place, and time [Affect] : the affect was normal [Memory Recent] : recent memory was not impaired [Memory Remote] : remote memory was not impaired [Cranial Nerves Optic (II)] : visual acuity intact bilaterally,  visual fields full to confrontation, pupils equal round and reactive to light [Cranial Nerves Oculomotor (III)] : extraocular motion intact [Cranial Nerves Facial (VII)] : face symmetrical [Cranial Nerves Trigeminal (V)] : facial sensation intact symmetrically [Cranial Nerves Vestibulocochlear (VIII)] : hearing was intact bilaterally [Cranial Nerves Glossopharyngeal (IX)] : tongue and palate midline [Cranial Nerves Accessory (XI - Cranial And Spinal)] : head turning and shoulder shrug symmetric [Cranial Nerves Hypoglossal (XII)] : there was no tongue deviation with protrusion [Motor Strength] : muscle strength was normal in all four extremities [Sensation Tactile Decrease] : light touch was intact [Sensation Pain / Temperature Decrease] : pain and temperature was intact [Sensation Vibration Decrease] : vibration was intact [Tremor] : a tremor present [1+] : Patella left 1+ [Optic Disc Abnormality] : the optic disc were normal in size and color [Edema] : there was no peripheral edema [Dysarthria] : no dysarthria [Aphasia] : no dysphasia/aphasia [Coordination - Dysmetria Impaired Finger-to-Nose Bilateral] : not present [Plantar Reflex Right Only] : normal on the right [Plantar Reflex Left Only] : normal on the left [FreeTextEntry6] : There is increased tone bilaterally with cogwheeling at the wrists and elbows. Strength is full to manual motor testing. The right hand is somewhat contracted. [FreeTextEntry8] : Gait is broad-based and shuffling. She is ambulating without an assistive device today.

## 2024-09-12 NOTE — DATA REVIEWED
[de-identified] : Brain MRI was performed on 4/8/11. 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 It was otherwise unremarkable. [de-identified] : Elda scan demonstrated bilateral reduced uptake in the basal ganglia.

## 2024-09-16 ENCOUNTER — TRANSCRIPTION ENCOUNTER (OUTPATIENT)
Age: 69
End: 2024-09-16

## 2025-01-27 ENCOUNTER — TRANSCRIPTION ENCOUNTER (OUTPATIENT)
Age: 70
End: 2025-01-27

## 2025-01-30 NOTE — ASSESSMENT
[FreeTextEntry1] : This is a 66 year-old woman with Parkinson's disease.\par She remains on Sinemet and Comtan three times per day.\par I had added a bedtime dose of Sinemet ER.\par \par Her physical therapist had given her exercises to do at home which she reports she is doing.\par \par Is now been having increasing of episodes.\par I had suggested a trial of Inbrija to be used as needed.  (This is an inhaled rescue dose of levodopa).\par \par I will see her back in a few months. Render In Strict Bullet Format?: No Detail Level: Zone Modify Regimen: Increase spironolactone to 50mg one q 12hrs

## 2025-02-28 ENCOUNTER — RX RENEWAL (OUTPATIENT)
Age: 70
End: 2025-02-28

## 2025-03-12 ENCOUNTER — APPOINTMENT (OUTPATIENT)
Dept: NEUROLOGY | Facility: CLINIC | Age: 70
End: 2025-03-12
Payer: MEDICARE

## 2025-03-12 VITALS
SYSTOLIC BLOOD PRESSURE: 124 MMHG | HEIGHT: 65 IN | WEIGHT: 123 LBS | DIASTOLIC BLOOD PRESSURE: 69 MMHG | BODY MASS INDEX: 20.49 KG/M2

## 2025-03-12 DIAGNOSIS — G20.A1 PARKINSON'S DISEASE WITHOUT DYSKINESIA, WITHOUT MENTION OF FLUCTUATIONS: ICD-10-CM

## 2025-03-12 PROCEDURE — G2211 COMPLEX E/M VISIT ADD ON: CPT

## 2025-03-12 PROCEDURE — 99213 OFFICE O/P EST LOW 20 MIN: CPT

## 2025-03-13 ENCOUNTER — TRANSCRIPTION ENCOUNTER (OUTPATIENT)
Age: 70
End: 2025-03-13

## 2025-03-27 ENCOUNTER — RX RENEWAL (OUTPATIENT)
Age: 70
End: 2025-03-27

## 2025-04-30 ENCOUNTER — RX RENEWAL (OUTPATIENT)
Age: 70
End: 2025-04-30

## 2025-05-02 ENCOUNTER — TRANSCRIPTION ENCOUNTER (OUTPATIENT)
Age: 70
End: 2025-05-02

## 2025-06-03 NOTE — PHYSICAL EXAM
Rx Refill Note  Requested Prescriptions     Pending Prescriptions Disp Refills    gabapentin (NEURONTIN) 100 MG capsule       Sig: Take 1 capsule by mouth Daily.      Last office visit with prescribing clinician: 2/5/2025   Last telemedicine visit with prescribing clinician: Visit date not found   Next office visit with prescribing clinician: 6/16/2025                       UDS 2/17/2023  CSA  None on File    pATIENT HAS ANPPT SCHEDULED 6/16/2025.  INSPECT - SCAN - [id number] 6/3/2025 (06/03/2025)     Bettie Rincon MA  06/03/25, 15:49 EDT     [General Appearance - Alert] : alert [General Appearance - In No Acute Distress] : in no acute distress [Oriented To Time, Place, And Person] : oriented to person, place, and time [Affect] : the affect was normal [Memory Recent] : recent memory was not impaired [Memory Remote] : remote memory was not impaired [Cranial Nerves Optic (II)] : visual acuity intact bilaterally,  visual fields full to confrontation, pupils equal round and reactive to light [Cranial Nerves Oculomotor (III)] : extraocular motion intact [Cranial Nerves Trigeminal (V)] : facial sensation intact symmetrically [Cranial Nerves Facial (VII)] : face symmetrical [Cranial Nerves Vestibulocochlear (VIII)] : hearing was intact bilaterally [Cranial Nerves Glossopharyngeal (IX)] : tongue and palate midline [Cranial Nerves Accessory (XI - Cranial And Spinal)] : head turning and shoulder shrug symmetric [Cranial Nerves Hypoglossal (XII)] : there was no tongue deviation with protrusion [Motor Strength] : muscle strength was normal in all four extremities [Sensation Tactile Decrease] : light touch was intact [Sensation Pain / Temperature Decrease] : pain and temperature was intact [Sensation Vibration Decrease] : vibration was intact [Tremor] : a tremor present [1+] : Patella left 1+ [Optic Disc Abnormality] : the optic disc were normal in size and color [Edema] : there was no peripheral edema [Dysarthria] : no dysarthria [Aphasia] : no dysphasia/aphasia [Coordination - Dysmetria Impaired Finger-to-Nose Bilateral] : not present [Plantar Reflex Right Only] : normal on the right [Plantar Reflex Left Only] : normal on the left [FreeTextEntry6] : There is increased tone bilaterally with cogwheeling at the wrists and elbows. Strength is full to manual motor testing. The right hand is somewhat contracted. [FreeTextEntry8] : Gait is broad-based and shuffling. She is ambulating without an assistive device today.

## 2025-06-25 ENCOUNTER — RX RENEWAL (OUTPATIENT)
Age: 70
End: 2025-06-25